# Patient Record
Sex: MALE | Race: WHITE | NOT HISPANIC OR LATINO | Employment: FULL TIME | URBAN - NONMETROPOLITAN AREA
[De-identification: names, ages, dates, MRNs, and addresses within clinical notes are randomized per-mention and may not be internally consistent; named-entity substitution may affect disease eponyms.]

---

## 2020-02-09 ENCOUNTER — APPOINTMENT (OUTPATIENT)
Dept: CT IMAGING | Facility: HOSPITAL | Age: 40
End: 2020-02-09

## 2020-02-09 ENCOUNTER — HOSPITAL ENCOUNTER (EMERGENCY)
Facility: HOSPITAL | Age: 40
Discharge: PSYCHIATRIC OR SUBSTANCE USE FACILITY | End: 2020-02-10
Attending: EMERGENCY MEDICINE | Admitting: STUDENT IN AN ORGANIZED HEALTH CARE EDUCATION/TRAINING PROGRAM

## 2020-02-09 DIAGNOSIS — F32.A DEPRESSION, UNSPECIFIED DEPRESSION TYPE: Primary | ICD-10-CM

## 2020-02-09 DIAGNOSIS — T71.162A SUICIDE ATTEMPT BY HANGING, INITIAL ENCOUNTER (HCC): ICD-10-CM

## 2020-02-09 LAB
ALBUMIN SERPL-MCNC: 4.4 G/DL (ref 3.5–5.2)
ALBUMIN/GLOB SERPL: 1.6 G/DL
ALP SERPL-CCNC: 113 U/L (ref 39–117)
ALT SERPL W P-5'-P-CCNC: 35 U/L (ref 1–41)
AMPHET+METHAMPHET UR QL: NEGATIVE
AMPHETAMINES UR QL: NEGATIVE
ANION GAP SERPL CALCULATED.3IONS-SCNC: 12.3 MMOL/L (ref 5–15)
APAP SERPL-MCNC: <5 MCG/ML (ref 10–30)
AST SERPL-CCNC: 29 U/L (ref 1–40)
BARBITURATES UR QL SCN: NEGATIVE
BASOPHILS # BLD AUTO: 0.04 10*3/MM3 (ref 0–0.2)
BASOPHILS NFR BLD AUTO: 0.3 % (ref 0–1.5)
BENZODIAZ UR QL SCN: NEGATIVE
BILIRUB SERPL-MCNC: 0.4 MG/DL (ref 0.2–1.2)
BUN BLD-MCNC: 12 MG/DL (ref 6–20)
BUN/CREAT SERPL: 11.2 (ref 7–25)
BUPRENORPHINE SERPL-MCNC: NEGATIVE NG/ML
CALCIUM SPEC-SCNC: 9.4 MG/DL (ref 8.6–10.5)
CANNABINOIDS SERPL QL: NEGATIVE
CHLORIDE SERPL-SCNC: 102 MMOL/L (ref 98–107)
CO2 SERPL-SCNC: 25.7 MMOL/L (ref 22–29)
COCAINE UR QL: NEGATIVE
CREAT BLD-MCNC: 1.07 MG/DL (ref 0.76–1.27)
DEPRECATED RDW RBC AUTO: 47.8 FL (ref 37–54)
EOSINOPHIL # BLD AUTO: 0.09 10*3/MM3 (ref 0–0.4)
EOSINOPHIL NFR BLD AUTO: 0.7 % (ref 0.3–6.2)
ERYTHROCYTE [DISTWIDTH] IN BLOOD BY AUTOMATED COUNT: 13.6 % (ref 12.3–15.4)
ETHANOL BLD-MCNC: <10 MG/DL (ref 0–10)
ETHANOL UR QL: <0.01 %
GFR SERPL CREATININE-BSD FRML MDRD: 77 ML/MIN/1.73
GLOBULIN UR ELPH-MCNC: 2.7 GM/DL
GLUCOSE BLD-MCNC: 114 MG/DL (ref 65–99)
HCT VFR BLD AUTO: 52.8 % (ref 37.5–51)
HGB BLD-MCNC: 17.8 G/DL (ref 13–17.7)
HOLD SPECIMEN: NORMAL
HOLD SPECIMEN: NORMAL
IMM GRANULOCYTES # BLD AUTO: 0.03 10*3/MM3 (ref 0–0.05)
IMM GRANULOCYTES NFR BLD AUTO: 0.2 % (ref 0–0.5)
LYMPHOCYTES # BLD AUTO: 2.07 10*3/MM3 (ref 0.7–3.1)
LYMPHOCYTES NFR BLD AUTO: 15.1 % (ref 19.6–45.3)
MAGNESIUM SERPL-MCNC: 2 MG/DL (ref 1.6–2.6)
MCH RBC QN AUTO: 31.7 PG (ref 26.6–33)
MCHC RBC AUTO-ENTMCNC: 33.7 G/DL (ref 31.5–35.7)
MCV RBC AUTO: 94 FL (ref 79–97)
METHADONE UR QL SCN: NEGATIVE
MONOCYTES # BLD AUTO: 1.18 10*3/MM3 (ref 0.1–0.9)
MONOCYTES NFR BLD AUTO: 8.6 % (ref 5–12)
NEUTROPHILS # BLD AUTO: 10.33 10*3/MM3 (ref 1.7–7)
NEUTROPHILS NFR BLD AUTO: 75.1 % (ref 42.7–76)
NRBC BLD AUTO-RTO: 0 /100 WBC (ref 0–0.2)
OPIATES UR QL: NEGATIVE
OXYCODONE UR QL SCN: NEGATIVE
PCP UR QL SCN: NEGATIVE
PLATELET # BLD AUTO: 237 10*3/MM3 (ref 140–450)
PMV BLD AUTO: 9.5 FL (ref 6–12)
POTASSIUM BLD-SCNC: 4.2 MMOL/L (ref 3.5–5.2)
PROPOXYPH UR QL: NEGATIVE
PROT SERPL-MCNC: 7.1 G/DL (ref 6–8.5)
RBC # BLD AUTO: 5.62 10*6/MM3 (ref 4.14–5.8)
SALICYLATES SERPL-MCNC: <0.3 MG/DL
SODIUM BLD-SCNC: 140 MMOL/L (ref 136–145)
TRICYCLICS UR QL SCN: NEGATIVE
TROPONIN T SERPL-MCNC: <0.01 NG/ML (ref 0–0.03)
WBC NRBC COR # BLD: 13.74 10*3/MM3 (ref 3.4–10.8)
WHOLE BLOOD HOLD SPECIMEN: NORMAL
WHOLE BLOOD HOLD SPECIMEN: NORMAL

## 2020-02-09 PROCEDURE — 80307 DRUG TEST PRSMV CHEM ANLYZR: CPT

## 2020-02-09 PROCEDURE — 84484 ASSAY OF TROPONIN QUANT: CPT | Performed by: EMERGENCY MEDICINE

## 2020-02-09 PROCEDURE — 85025 COMPLETE CBC W/AUTO DIFF WBC: CPT | Performed by: EMERGENCY MEDICINE

## 2020-02-09 PROCEDURE — 99284 EMERGENCY DEPT VISIT MOD MDM: CPT

## 2020-02-09 PROCEDURE — 83735 ASSAY OF MAGNESIUM: CPT

## 2020-02-09 PROCEDURE — 93005 ELECTROCARDIOGRAM TRACING: CPT

## 2020-02-09 PROCEDURE — 80053 COMPREHEN METABOLIC PANEL: CPT | Performed by: EMERGENCY MEDICINE

## 2020-02-09 PROCEDURE — 72125 CT NECK SPINE W/O DYE: CPT

## 2020-02-09 PROCEDURE — 70450 CT HEAD/BRAIN W/O DYE: CPT

## 2020-02-09 NOTE — ED PROVIDER NOTES
Subjective   39-year-old male presenting with suicide attempt.  Patient tells me that he has struggled with depression for some time, recently started a new antidepressant.  States that everything came to ahead today and when he got out of the shower he had overwhelming thoughts wanting to hurt himself, tied a neck tie around his neck and then tied it to the door handle and sat down.  He was then found by his girlfriend and son.  He has no specific complaints at this time.  He has never tried to harm himself before.  He denies any drug or alcohol use.          Review of Systems   Constitutional: Negative.    HENT: Negative.    Eyes: Negative.    Respiratory: Negative.    Cardiovascular: Negative.    Gastrointestinal: Negative.    Genitourinary: Negative.    Musculoskeletal: Negative.    Skin: Negative.    Neurological: Negative.    Psychiatric/Behavioral: Positive for dysphoric mood, self-injury and suicidal ideas.       Past Medical History:   Diagnosis Date   • Depression        No Known Allergies    Past Surgical History:   Procedure Laterality Date   • HERNIA REPAIR     • KNEE CARTILAGE SURGERY         No family history on file.    Social History     Socioeconomic History   • Marital status:      Spouse name: Not on file   • Number of children: Not on file   • Years of education: Not on file   • Highest education level: Not on file   Tobacco Use   • Smoking status: Former Smoker     Types: Cigarettes   Substance and Sexual Activity   • Alcohol use: Yes     Comment: socially   • Drug use: Never           Objective   Physical Exam   Constitutional: He is oriented to person, place, and time. He appears well-developed and well-nourished. No distress.   HENT:   Head: Normocephalic and atraumatic.   Right Ear: External ear normal.   Left Ear: External ear normal.   Nose: Nose normal.   Mouth/Throat: Oropharynx is clear and moist.   Occlusion normal   Eyes: Pupils are equal, round, and reactive to light.  Conjunctivae and EOM are normal.   Neck: Normal range of motion. Neck supple.   No hematomas, crepitus or swelling, no midline tenderness anteriorly or over the spine   Cardiovascular: Regular rhythm, normal heart sounds and intact distal pulses.   No carotid bruit, mild tachycardia   Pulmonary/Chest: Effort normal and breath sounds normal. No respiratory distress.   Abdominal: Soft. Bowel sounds are normal. He exhibits no distension. There is no tenderness. There is no rebound and no guarding.   Musculoskeletal: Normal range of motion. He exhibits no edema, tenderness or deformity.   Neurological: He is alert and oriented to person, place, and time.   Normal strength and sensation, gait normal   Skin: Skin is warm and dry. No rash noted.   No erythema or contusions   Psychiatric: He has a normal mood and affect. His behavior is normal.   Nursing note and vitals reviewed.      Procedures           ED Course  ED Course as of Feb 09 2300   Sun Feb 09, 2020 2138 Went in and met the patient's parents as well as his girlfriend.  We did discuss his insurance issues with his preferred place of placement, the [facility].  He has agreed to go willingly to [place] at this time.    [DT]   2258 Patient was accepted for transfer to [facility] in [place] by Dr. Fernando.    [DT]      ED Course User Index  [DT] Parker Vora MD                                               MDM  Number of Diagnoses or Management Options  Depression, unspecified depression type:   Suicide attempt by hanging, initial encounter (CMS/[facility]):   Diagnosis management comments: 39-year-old male with depression and suicide attempt.  Well-developed, well-nourished man in no distress with exam as above.  He has no findings of trauma to the neck.  Will obtain CT scan of the head and neck, lab work and EKG.  Will have behavioral health consult.  Disposition pending work-up and consultation.    DDX: Suicide attempt, depression, anxiety    EKG interpreted by me: Sinus  tachycardia, no acute ST/T changes, this is an abnormal EKG      Final diagnoses:   Depression, unspecified depression type   Suicide attempt by hanging, initial encounter (CMS/[facility])            Parker Vora MD  02/09/20 5597
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
## 2020-02-10 ENCOUNTER — HOSPITAL ENCOUNTER (INPATIENT)
Facility: HOSPITAL | Age: 40
LOS: 1 days | Discharge: HOME | End: 2020-02-11
Attending: PSYCHIATRY & NEUROLOGY | Admitting: PSYCHIATRY & NEUROLOGY

## 2020-02-10 VITALS
DIASTOLIC BLOOD PRESSURE: 87 MMHG | OXYGEN SATURATION: 96 % | RESPIRATION RATE: 18 BRPM | HEIGHT: 69 IN | WEIGHT: 205 LBS | HEART RATE: 85 BPM | TEMPERATURE: 98 F | SYSTOLIC BLOOD PRESSURE: 155 MMHG | BODY MASS INDEX: 30.36 KG/M2

## 2020-02-10 PROBLEM — F33.3 SEVERE RECURRENT MAJOR DEPRESSION WITH PSYCHOTIC FEATURES (HCC): Status: ACTIVE | Noted: 2020-02-10

## 2020-02-10 PROBLEM — F63.9 IMPULSE CONTROL DISORDER: Status: ACTIVE | Noted: 2020-02-10

## 2020-02-10 PROBLEM — F43.25 ADJUSTMENT DISORDER WITH MIXED DISTURBANCE OF EMOTIONS AND CONDUCT: Status: ACTIVE | Noted: 2020-02-10

## 2020-02-10 LAB
ALBUMIN SERPL-MCNC: 4.04 G/DL (ref 3.5–5.2)
ALBUMIN/GLOB SERPL: 1.5 G/DL
ALP SERPL-CCNC: 107 U/L (ref 39–117)
ALT SERPL W P-5'-P-CCNC: 30 U/L (ref 1–41)
ANION GAP SERPL CALCULATED.3IONS-SCNC: 11.2 MMOL/L (ref 5–15)
AST SERPL-CCNC: 24 U/L (ref 1–40)
BILIRUB SERPL-MCNC: 0.5 MG/DL (ref 0.2–1.2)
BUN BLD-MCNC: 12 MG/DL (ref 6–20)
BUN/CREAT SERPL: 11.7 (ref 7–25)
CALCIUM SPEC-SCNC: 9.2 MG/DL (ref 8.6–10.5)
CHLORIDE SERPL-SCNC: 103 MMOL/L (ref 98–107)
CO2 SERPL-SCNC: 25.8 MMOL/L (ref 22–29)
CREAT BLD-MCNC: 1.03 MG/DL (ref 0.76–1.27)
GFR SERPL CREATININE-BSD FRML MDRD: 81 ML/MIN/1.73
GLOBULIN UR ELPH-MCNC: 2.8 GM/DL
GLUCOSE BLD-MCNC: 103 MG/DL (ref 65–99)
POTASSIUM BLD-SCNC: 4.3 MMOL/L (ref 3.5–5.2)
PROT SERPL-MCNC: 6.8 G/DL (ref 6–8.5)
SODIUM BLD-SCNC: 140 MMOL/L (ref 136–145)

## 2020-02-10 PROCEDURE — 99223 1ST HOSP IP/OBS HIGH 75: CPT | Performed by: PSYCHIATRY & NEUROLOGY

## 2020-02-10 PROCEDURE — 80053 COMPREHEN METABOLIC PANEL: CPT | Performed by: PSYCHIATRY & NEUROLOGY

## 2020-02-10 RX ORDER — FAMOTIDINE 20 MG/1
20 TABLET, FILM COATED ORAL 2 TIMES DAILY PRN
Status: DISCONTINUED | OUTPATIENT
Start: 2020-02-10 | End: 2020-02-11 | Stop reason: HOSPADM

## 2020-02-10 RX ORDER — BENZTROPINE MESYLATE 1 MG/ML
1 INJECTION INTRAMUSCULAR; INTRAVENOUS ONCE AS NEEDED
Status: DISCONTINUED | OUTPATIENT
Start: 2020-02-10 | End: 2020-02-11 | Stop reason: HOSPADM

## 2020-02-10 RX ORDER — NICOTINE 21 MG/24HR
1 PATCH, TRANSDERMAL 24 HOURS TRANSDERMAL
Status: DISCONTINUED | OUTPATIENT
Start: 2020-02-10 | End: 2020-02-11 | Stop reason: HOSPADM

## 2020-02-10 RX ORDER — L.ACID,CASEI/B.ANIMAL/S.THERMO 16B CELL
1 CAPSULE ORAL DAILY
Status: CANCELLED | OUTPATIENT
Start: 2020-02-10

## 2020-02-10 RX ORDER — ACETAMINOPHEN 325 MG/1
650 TABLET ORAL EVERY 6 HOURS PRN
Status: DISCONTINUED | OUTPATIENT
Start: 2020-02-10 | End: 2020-02-11 | Stop reason: HOSPADM

## 2020-02-10 RX ORDER — ECHINACEA PURPUREA EXTRACT 125 MG
2 TABLET ORAL AS NEEDED
Status: DISCONTINUED | OUTPATIENT
Start: 2020-02-10 | End: 2020-02-11 | Stop reason: HOSPADM

## 2020-02-10 RX ORDER — RANITIDINE HCL 75 MG
75 TABLET ORAL DAILY PRN
COMMUNITY

## 2020-02-10 RX ORDER — BENZONATATE 100 MG/1
100 CAPSULE ORAL 3 TIMES DAILY PRN
Status: DISCONTINUED | OUTPATIENT
Start: 2020-02-10 | End: 2020-02-11 | Stop reason: HOSPADM

## 2020-02-10 RX ORDER — BUPROPION HYDROCHLORIDE 150 MG/1
150 TABLET ORAL DAILY
Status: DISCONTINUED | OUTPATIENT
Start: 2020-02-10 | End: 2020-02-11 | Stop reason: HOSPADM

## 2020-02-10 RX ORDER — MULTIVITAMIN
1 TABLET ORAL DAILY
Status: CANCELLED | OUTPATIENT
Start: 2020-02-10

## 2020-02-10 RX ORDER — IBUPROFEN 400 MG/1
400 TABLET ORAL EVERY 6 HOURS PRN
Status: DISCONTINUED | OUTPATIENT
Start: 2020-02-10 | End: 2020-02-11 | Stop reason: HOSPADM

## 2020-02-10 RX ORDER — CHLORAL HYDRATE 500 MG
1000 CAPSULE ORAL DAILY
COMMUNITY

## 2020-02-10 RX ORDER — L.ACID,PARA/B.BIFIDUM/S.THERM 8B CELL
1 CAPSULE ORAL DAILY
Status: DISCONTINUED | OUTPATIENT
Start: 2020-02-10 | End: 2020-02-11 | Stop reason: HOSPADM

## 2020-02-10 RX ORDER — IBUPROFEN 400 MG/1
200 TABLET ORAL EVERY 8 HOURS PRN
Status: CANCELLED | OUTPATIENT
Start: 2020-02-10

## 2020-02-10 RX ORDER — ONDANSETRON 4 MG/1
4 TABLET, FILM COATED ORAL EVERY 6 HOURS PRN
Status: DISCONTINUED | OUTPATIENT
Start: 2020-02-10 | End: 2020-02-11 | Stop reason: HOSPADM

## 2020-02-10 RX ORDER — TRAZODONE HYDROCHLORIDE 50 MG/1
50 TABLET ORAL NIGHTLY PRN
Status: DISCONTINUED | OUTPATIENT
Start: 2020-02-10 | End: 2020-02-11 | Stop reason: HOSPADM

## 2020-02-10 RX ORDER — LOPERAMIDE HYDROCHLORIDE 2 MG/1
2 CAPSULE ORAL
Status: DISCONTINUED | OUTPATIENT
Start: 2020-02-10 | End: 2020-02-11 | Stop reason: HOSPADM

## 2020-02-10 RX ORDER — L.ACID,CASEI/B.ANIMAL/S.THERMO 16B CELL
1 CAPSULE ORAL DAILY
COMMUNITY

## 2020-02-10 RX ORDER — MULTIVITAMIN
1 TABLET ORAL DAILY
Status: DISCONTINUED | OUTPATIENT
Start: 2020-02-10 | End: 2020-02-11 | Stop reason: HOSPADM

## 2020-02-10 RX ORDER — BENZTROPINE MESYLATE 1 MG/1
2 TABLET ORAL ONCE AS NEEDED
Status: DISCONTINUED | OUTPATIENT
Start: 2020-02-10 | End: 2020-02-11 | Stop reason: HOSPADM

## 2020-02-10 RX ORDER — FAMOTIDINE 20 MG/1
20 TABLET, FILM COATED ORAL DAILY PRN
Status: CANCELLED | OUTPATIENT
Start: 2020-02-10

## 2020-02-10 RX ORDER — MULTIVITAMIN
1 POWDER IN PACKET (EA) ORAL DAILY
COMMUNITY

## 2020-02-10 RX ORDER — HYDROXYZINE 50 MG/1
50 TABLET, FILM COATED ORAL EVERY 6 HOURS PRN
Status: DISCONTINUED | OUTPATIENT
Start: 2020-02-10 | End: 2020-02-11 | Stop reason: HOSPADM

## 2020-02-10 RX ORDER — ALUMINA, MAGNESIA, AND SIMETHICONE 2400; 2400; 240 MG/30ML; MG/30ML; MG/30ML
15 SUSPENSION ORAL EVERY 6 HOURS PRN
Status: DISCONTINUED | OUTPATIENT
Start: 2020-02-10 | End: 2020-02-11 | Stop reason: HOSPADM

## 2020-02-10 RX ORDER — IBUPROFEN 200 MG
200 TABLET ORAL EVERY 8 HOURS PRN
COMMUNITY

## 2020-02-10 RX ADMIN — SERTRALINE 25 MG: 50 TABLET, FILM COATED ORAL at 11:35

## 2020-02-10 RX ADMIN — HYDROXYZINE HYDROCHLORIDE 50 MG: 50 TABLET ORAL at 02:26

## 2020-02-10 RX ADMIN — TRAZODONE HYDROCHLORIDE 50 MG: 50 TABLET ORAL at 20:48

## 2020-02-10 RX ADMIN — BUPROPION HYDROCHLORIDE 150 MG: 150 TABLET, FILM COATED, EXTENDED RELEASE ORAL at 11:34

## 2020-02-10 RX ADMIN — CARIPRAZINE 1.5 MG: 1.5 CAPSULE, GELATIN COATED ORAL at 08:42

## 2020-02-10 RX ADMIN — Medication 1 TABLET: at 08:42

## 2020-02-10 RX ADMIN — Medication 1 CAPSULE: at 08:42
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
## 2020-02-10 NOTE — PLAN OF CARE
PT RATES ANXIETY AND DEPRESSION 1/10. DENIES ANY SI, HI, OR AVH. PT IS POSITIVE, CALM, AND COOPERATIVE DURING ASSESSMENT.

## 2020-02-10 NOTE — ED NOTES
"Update Timeline:    1930 Briefed on case by Lisa Summers, [facility] on-call therapist.  She prepared paperwork and faxed referral to the [facility].  Phoned the [facility] at 1955 to confirm receipt of referral, spoke with Key Montes De Oca in adult intake.  Partial paperwork has come through but not all of it.  Key requested emailed copy of referral. Emailed at 1955.  Will follow up after assessment of another pt here in the ED.  Pt is agreeable at his time, though reluctantly, to do to the [facility].  He did mention signing out AMA, but pt was advised if he did so police would be contacted.    2045 Spoke with Key at the [facility].  She reports if pt does not have insurance, he will have to pay up front for care.   Will re-visit options with pt after another pt assessment here in the ED is completed.    2115 Met with pt at bedside with Dr. Vora.  Pt reports he has no insurance.  Advised that he is not eligible to go to the [facility] without insurance without meeting up front out-of pocket pay.  Advised his options are [facility] or the [facility] at this time.  Pt reported it was \"no use\" to go to the [facility].   Reported in October 2018 he had an assessment there and \"they sent me home.  They didn't do anything.\"  He reports this was done under a 202A order.   Advised this admission would be a direct admission from [facility] to [facility], so there would be no determination re: admission status after he gets there.  Pt verbalized he was agreeable for referral to [facility] at this time.      2126   Phoned Gianfranco Santos RN at [facility].  Briefed her on pt assessment details and reviewed chart for needed information.  She is working on another direct admit and will review referral when completed.  Disposition pending.    Martita Vera, MSW, CSW  Certified Therapist    2223 Received call from Lead MARLEEN Santos.  MD has requested updated vitals and requested impression re: pt's elevated WBC count.  Consulted medical team.  MARLEEN Carmen will " obtain vitals.  Spoke with Dr. Vora who reports elevated WBC is a result of the acute stress reaction r/t pt trying to hang himself and further workup is not warranted at this time.    2230 Phoned Danielle, provided her with updated vitals and response from Dr. Vora re: WBC count.      2256  Received call from Danielle.  Pt has been accepted by Dr. Fernando as communicated by the Lead RN.  He will be going to [facility], report to [phone] ext. [phone], report to MARLEEN Alejo.  [facility] transport contacted, ETA [id number].  Medical team notified.    DAT Rhodes, CSW  Certified Therapist                       Martita Vera CSW  02/09/20 0420

## 2020-02-10 NOTE — ED NOTES
"0030  Was advised by nursing staff and security staff that pt became upset when he was unable to have his cell phone and clothes and refused to get in the [facility] car and refusing to \"go in a cage.\"  Pt started to elope and security advised pt could go voluntarily via [facility] or [employer] would be contacted for assistance.  Pt returned to car and got into [facility] transport vehicle.    0035 Phoned Danielle, Lead RN at [facility] to advise of recent events re: pt being upset regarding his paper scrubs, phone, and transport via [employer].  Advised he was en route at this time but wanted her to know about his bx here at the end of his stay as reported by RN staff and security.     Martita Vera, CSW  02/10/20 0048    "

## 2020-02-10 NOTE — NURSING NOTE
"Patient is a direct admit from [facility]. Patient attempted suicide by hanging himself with a tie. He reports he doesn't really remember what all happened today. He reports his stressors as going through a \"messy\" divorce and his medication change. He says \"Im just tired of all this drama.\" He reports he also feels like his medication took a big part in what happened. He was switched from Seroquel to vraylar about 2 weeks ago. He says since taking this he has no emotions and has felt numb. He denies si, hi, and avh at this time. He says \"After seeing how my family reacted I regret everything I have done.\"  "

## 2020-02-10 NOTE — PLAN OF CARE
Problem: Patient Care Overview  Goal: Plan of Care Review  Outcome: Ongoing (interventions implemented as appropriate)  Flowsheets (Taken 2/10/2020 0245)  Progress: no change  Plan of Care Reviewed With: patient  Patient Agreement with Plan of Care: agrees  Note:   New Pt.

## 2020-02-10 NOTE — DISCHARGE INSTR - APPOINTMENTS
[facility]  [address] [facility] [address] [place] [id number]   [phone]    2/12/2020 @ 10:00am with Geni Onofre
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
## 2020-02-11 VITALS
TEMPERATURE: 99.3 F | OXYGEN SATURATION: 96 % | HEART RATE: 82 BPM | HEIGHT: 69 IN | DIASTOLIC BLOOD PRESSURE: 105 MMHG | BODY MASS INDEX: 29.95 KG/M2 | SYSTOLIC BLOOD PRESSURE: 144 MMHG | RESPIRATION RATE: 16 BRPM | WEIGHT: 202.2 LBS

## 2020-02-11 PROCEDURE — 99239 HOSP IP/OBS DSCHRG MGMT >30: CPT | Performed by: PSYCHIATRY & NEUROLOGY

## 2020-02-11 RX ORDER — BUPROPION HYDROCHLORIDE 150 MG/1
150 TABLET ORAL DAILY
Qty: 30 TABLET | Refills: 0 | Status: SHIPPED | OUTPATIENT
Start: 2020-02-12

## 2020-02-11 RX ADMIN — BUPROPION HYDROCHLORIDE 150 MG: 150 TABLET, FILM COATED, EXTENDED RELEASE ORAL at 08:12

## 2020-02-11 RX ADMIN — ACETAMINOPHEN 650 MG: 325 TABLET ORAL at 15:19

## 2020-02-11 RX ADMIN — SERTRALINE 25 MG: 50 TABLET, FILM COATED ORAL at 08:12

## 2020-02-11 RX ADMIN — Medication 1 CAPSULE: at 08:11

## 2020-02-11 RX ADMIN — Medication 1 TABLET: at 08:11

## 2020-02-11 NOTE — PROGRESS NOTES
"INPATIENT PSYCHIATRIC PROGRESS NOTE    Name:  Ariel San  :  1980  MRN:  [id number]  Visit Number:  [id number]  Length of stay:  1    SUBJECTIVE  CC/Focus of Exam: depression, SI/SA    INTERVAL HISTORY:  Patient appearing better today. His estranged wife came for visitation yesterday, which he greatly appreciated. He feels supported and that will help with the recovery process.  They discussed continued individual therapy and the possibility of couples therapy.  He reports they are going to try to get back together, which seems to be a bit of a dramatic turnaround, so that may be an increased risk in the relationship instability that contributed to his impulsive act in the first place.    Depression rating 3/10  Anxiety rating 7/10  Sleep: better with trazodone  Withdrawal sx: denies  Cravin/10    Review of Systems   Constitutional: Negative.    Respiratory: Negative.    Cardiovascular: Negative.    Gastrointestinal: Negative.    Musculoskeletal: Negative.    Psychiatric/Behavioral: The patient is nervous/anxious and is hyperactive.        OBJECTIVE    Temp:  [98.6 °F (37 °C)-99.3 °F (37.4 °C)] 99.3 °F (37.4 °C)  Heart Rate:  [71-82] 82  Resp:  [16-18] 16  BP: (128-155)/() 144/105    MENTAL STATUS EXAM:  Appearance:Casually dressed, good hygeine.   Cooperation:Cooperative  Psychomotor: No psychomotor agitation/retardation, No EPS, No motor tics  Speech-normal rate, amount.  Mood \"feeling good\"   Affect-congruent, appropriate, stable  Thought Content-goal directed, no delusional material present  Thought process-linear, organized.  Suicidality: No SI  Homicidality: No HI  Perception: No AH/VH  Insight-fair   Judgement-fair    Lab Results (last 24 hours)     ** No results found for the last 24 hours. **             Imaging Results (Last 24 Hours)     ** No results found for the last 24 hours. **             ECG/EMG Results (most recent)     None           ALLERGIES: Patient has no known " allergies.      Current Facility-Administered Medications:   •  acetaminophen (TYLENOL) tablet 650 mg, 650 mg, Oral, Q6H PRN, Wong Fernando MD  •  aluminum-magnesium hydroxide-simethicone (MAALOX MAX) 400-400-40 MG/5ML suspension 15 mL, 15 mL, Oral, Q6H PRN, Wong Fernando MD  •  benzonatate (TESSALON) capsule 100 mg, 100 mg, Oral, TID PRN, Wong Fernando MD  •  benztropine (COGENTIN) tablet 2 mg, 2 mg, Oral, Once PRN **OR** benztropine (COGENTIN) injection 1 mg, 1 mg, Intramuscular, Once PRN, Wong Fernando MD  •  buPROPion XL (WELLBUTRIN XL) 24 hr tablet 150 mg, 150 mg, Oral, Daily, Gil Marion MD, 150 mg at 02/11/20 0812  •  famotidine (PEPCID) tablet 20 mg, 20 mg, Oral, BID PRN, Wong Fernando MD  •  hydrOXYzine (ATARAX) tablet 50 mg, 50 mg, Oral, Q6H PRN, Wong Fernando MD, 50 mg at 02/10/20 0226  •  ibuprofen (ADVIL,MOTRIN) tablet 400 mg, 400 mg, Oral, Q6H PRN, Wong Fernando MD  •  lactobacillus acidophilus (RISAQUAD) capsule 1 capsule, 1 capsule, Oral, Daily, Wong Fernando MD, 1 capsule at 02/11/20 0811  •  loperamide (IMODIUM) capsule 2 mg, 2 mg, Oral, Q2H PRN, Wong Fernando MD  •  magnesium hydroxide (MILK OF MAGNESIA) suspension 2400 mg/10mL 10 mL, 10 mL, Oral, Daily PRN, Wong Fernando MD  •  multivitamin (DAILY NIKOS) tablet 1 tablet, 1 tablet, Oral, Daily, Wong Fernando MD, 1 tablet at 02/11/20 0811  •  nicotine (NICODERM CQ) 21 MG/24HR patch 1 patch, 1 patch, Transdermal, Q24H, Wong Fernando MD  •  ondansetron (ZOFRAN) tablet 4 mg, 4 mg, Oral, Q6H PRN, Wong Fernando MD  •  [START ON 2/12/2020] sertraline (ZOLOFT) tablet 50 mg, 50 mg, Oral, Daily, Gil Marion MD  •  sodium chloride nasal spray 2 spray, 2 spray, Each Nare, PRN, Wong Fernando MD  •  traZODone (DESYREL) tablet 50 mg, 50 mg, Oral, Nightly PRN, Wong Fernando MD, 50 mg at 02/10/20 2048    Reviewed chart, notes, vitals, labs and EKG personally    ASSESSMENT & PLAN:      Adjustment disorder with mixed disturbance of emotions  and conduct    Impulse control disorder    Adjustment disorder with disturbance of emotions and conduct  -Patient with agitation, worsening mood and impulsive suicide attempt in the setting of interpersonal distress.  Admit for crisis stabilization  -Continue with current outpatient providers  -Patient denies suicidality, future oriented and requesting discharge.  Will discuss with therapist and family for safety planning and most likely consider this afternoon or tomorrow     Unspecified impulse control disorder  -Discontinue Vraylar 1.5 mg daily as he reports feeling mostly flat with little emotion whatsoever since starting the medication  -Increase sertraline to 50 mg daily  -Continue Wellbutrin  mg daily  -I do not believe that he qualifies for a personality disorder diagnosis at this time, but some of patient's current stressors appear related to narcissistic personality traits and related behaviors, including infidelity, compulsive behavior and minimization of severity of significant events, such as his infidelity and suicide attempt.  Should continue to address in outpatient therapy     Attempted hanging  -CT of head and neck within normal limits  -No reported pain.  Very minimal visible irritation on neck  -Monitor     Nicotine use disorder, early remission  -Encouraged patient on decision to stop smoking  -Wellbutrin may help with cravings as well     The patient has been admitted for safety and stabilization.  Patient will be monitored for suicidality daily and maintained on Special Precautions Level 3 (q15 min checks) .  The patient will have individual and group therapy with a master's level therapist. A master treatment plan will be developed and agreed upon by the patient and his/her treatment team.  The patient's estimated length of stay in the hospital is 2-3 days.     Special precautions: Special Precautions Level 3 (q15 min checks)     Behavioral Health Treatment Plan and Problem List: I have  reviewed and approved the Behavioral Health Treatment Plan and Problem list.  The patient has had a chance to review and agrees with the treatment plan.     Clinician:  Gil Marion MD  02/11/20  8:46 AM

## 2020-02-11 NOTE — PLAN OF CARE
"  Problem: Patient Care Overview  Goal: Interprofessional Rounds/Family Conf  Flowsheets (Taken 2/11/2020 1020)  Summary: Therapist staffed case with Dr Marion. He asked me to contact the patient's wife for discharge planning.  Note:   Therapist contacted the patient's wife and conducted safety and discharge planning. Strongly encouraged her to safeguard the home. She verbalized understanding.  Wife stated that she was ok with patient returning home today and would monitor him at home. She agrees to pick the patient up at discharge.    Met with patient one on one who is future oriented and looking forward to discharge. Patient states that he now has hope since his wife came for visitation last night. He states that they talked about going to couple's counseling in the near future instead of putting it off. He states that he and his wife are going to \"try to make things work\".     Discussed how patient could have handled his stressors more effectively. Patient states that in the future he is going to be more proactive instead of reactive. He denies any suicidal ideation today. Rates his depression at a 3 and his anxiety at a 7 on a 1-10 scale with 10 being the  most intense behavior. Patient is able to identify healthy coping skills as working; being active in the gym and spending time with his family.    Discussed the importance with patient of complying with medications and aftercare appt.       "

## 2020-02-11 NOTE — PLAN OF CARE
Problem: Patient Care Overview  Goal: Plan of Care Review  Outcome: Ongoing (interventions implemented as appropriate)  Flowsheets (Taken 2/11/2020 9841)  Progress: improving  Plan of Care Reviewed With: patient  Patient Agreement with Plan of Care: agrees  Outcome Summary: Patient calm and cooperative. Rates anxiety and depression both a 1. Denies SI/HI.

## 2020-02-13 NOTE — PROGRESS NOTES
Behavioral Health Discharge Summary             Please fax within 24 hours of discharge to [facility] at: [phone]      Member Name: Ariel San Member ID: [id number]   Authorization Number: [id number] Phone: [phone]   Member Address: [address] [place] [address]   Discharge Date: 02/11/20 Level of Care at Discharge:    Facility: [facility] Staff Completing Form: Irene ARCE RN    If the member is being discharged directly to a residential or extended care program, please specify the type below.   __Private Child-Caring Facility (PCC) Residential/Group Home   __Private Child-Caring Facility (PCC) Therapeutic Foster Care   __Residential Treatment Facility (RTF)   __Psychiatric Residential Treatment Facility (PRTF I or II)   __Long-Term Acute Inpatient Hospital Services or Extended Care Unit (ECU)   __Other (please specify):    Brief discharge summary of treatment received (for follow up by the case management team): D/C clinical with list of medications and follow up appts given to patient upon discharge.     BRIEF SUMMARY OF RECOMMENDATIONS FOR ONGOING TREATMENT     Discharged to where: Home   Discharge diagnoses: F 43.25 F 63.9   Axis I:    Axis II:    Axis III:    Axis IV:    Axis V:    Does the member understand his/her DX?  Yes          Medication     Dose     Schedule Supply/  Quantity  Given at Discharge RX Provided  Yes/No  If Rx Provided, Quantity RX Prior Auth Required  Yes/No Prior Auth  Completed   Wellbutrin  150 mg  Daily         sertraline 50 mg  Daily                                                                               Does the member understand the reason for taking these medications? Yes                                                           FOLLOW-UP APPOINTMENTS   Please schedule within 7 days of discharge and provide appointment details for all referred services.    PCP/Other Providers Involved in Treatment:     Appointment Type: [facility]  Provider Name: [facility]  Provider Phone:   [phone] Appointment Date: 02/12/20 Appointment Time:   10 AM with Geni Matthews   (new to OP services)        Case Management    Is the member already enrolled in case management?  Yes/No  If yes, date the CM was notified:    If no, was the CM referral offered?  Yes/No  Accepted? Yes/No    Is the Release of Information in the chart? Yes/No:      Medication Management (for member discharged with psychiatric medications):      A&D Treatment (for member with substance abuse/   dependence in the past year):      Medical Condition (for member with a medical condition):    Other recommended treatment:    Do you have any concerns about the discharge plan?  No    If yes, explain:    Was the member involved in the discharge planning?  Yes    If no, explain:    Was a copy of the discharge plan provided to the member?  Yes    If no, explain:
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
## 2021-09-13 ENCOUNTER — OFFICE VISIT (OUTPATIENT)
Dept: SLEEP MEDICINE | Facility: CLINIC | Age: 41
End: 2021-09-13

## 2021-09-13 VITALS
BODY MASS INDEX: 32.08 KG/M2 | HEART RATE: 87 BPM | WEIGHT: 216.6 LBS | OXYGEN SATURATION: 99 % | DIASTOLIC BLOOD PRESSURE: 80 MMHG | HEIGHT: 69 IN | SYSTOLIC BLOOD PRESSURE: 116 MMHG

## 2021-09-13 DIAGNOSIS — G47.33 OBSTRUCTIVE SLEEP APNEA, ADULT: ICD-10-CM

## 2021-09-13 DIAGNOSIS — E66.09 CLASS 1 OBESITY DUE TO EXCESS CALORIES WITHOUT SERIOUS COMORBIDITY WITH BODY MASS INDEX (BMI) OF 32.0 TO 32.9 IN ADULT: ICD-10-CM

## 2021-09-13 DIAGNOSIS — R06.83 SNORING: Primary | ICD-10-CM

## 2021-09-13 PROCEDURE — 99202 OFFICE O/P NEW SF 15 MIN: CPT | Performed by: INTERNAL MEDICINE

## 2021-11-17 ENCOUNTER — HOSPITAL ENCOUNTER (OUTPATIENT)
Dept: SLEEP MEDICINE | Facility: HOSPITAL | Age: 41
Discharge: HOME | End: 2021-11-17
Admitting: INTERNAL MEDICINE

## 2021-11-17 DIAGNOSIS — G47.33 OBSTRUCTIVE SLEEP APNEA, ADULT: ICD-10-CM

## 2021-11-17 DIAGNOSIS — R06.83 SNORING: ICD-10-CM

## 2021-11-17 PROCEDURE — 95806 SLEEP STUDY UNATT&RESP EFFT: CPT | Performed by: INTERNAL MEDICINE

## 2021-11-17 PROCEDURE — 95806 SLEEP STUDY UNATT&RESP EFFT: CPT

## 2021-12-03 ENCOUNTER — OFFICE VISIT (OUTPATIENT)
Dept: SLEEP MEDICINE | Facility: CLINIC | Age: 41
End: 2021-12-03

## 2021-12-03 VITALS
OXYGEN SATURATION: 98 % | WEIGHT: 216 LBS | SYSTOLIC BLOOD PRESSURE: 148 MMHG | BODY MASS INDEX: 31.99 KG/M2 | DIASTOLIC BLOOD PRESSURE: 76 MMHG | HEART RATE: 79 BPM | HEIGHT: 69 IN

## 2021-12-03 DIAGNOSIS — G47.33 OBSTRUCTIVE SLEEP APNEA, ADULT: Primary | ICD-10-CM

## 2021-12-03 DIAGNOSIS — E66.09 CLASS 1 OBESITY DUE TO EXCESS CALORIES WITHOUT SERIOUS COMORBIDITY WITH BODY MASS INDEX (BMI) OF 32.0 TO 32.9 IN ADULT: ICD-10-CM

## 2021-12-03 PROCEDURE — 99213 OFFICE O/P EST LOW 20 MIN: CPT | Performed by: INTERNAL MEDICINE

## 2021-12-03 RX ORDER — HYDROCODONE BITARTRATE AND ACETAMINOPHEN 10; 325 MG/1; MG/1
1 TABLET ORAL EVERY 6 HOURS PRN
COMMUNITY

## 2021-12-18 NOTE — PROGRESS NOTES
"Chief Complaint  Follow-up of snoring and nonrestorative sleep    Subjective         Ariel San presents to [facility] for the evaluation of snoring nonrestorative sleep.  Primary care provider is Dr. Goode.  He is seen in person in the sleep clinic.  History of Present Illness  Patient was last seen in clinic September 13.  He had snoring and nonrestorative sleep and obesity.  He says he has been seen about the same.  He says he still has significant snoring and has difficulty staying asleep.  He denies any real changes in his health status although he did have his wisdom teeth removed.  He thought his study night was fairly normal.    Review of Systems   Constitutional: Negative.    HENT: Negative.    Eyes: Negative.    Respiratory: Positive for apnea.    Cardiovascular: Negative.    Gastrointestinal: Negative.    Endocrine: Negative.    Genitourinary: Negative.    Musculoskeletal: Negative.    Skin: Negative.    Allergic/Immunologic: Negative.    Neurological: Negative.    Hematological: Negative.    Psychiatric/Behavioral: Negative.      [place] score is 13/24  Objective   Vital Signs:   /76 (BP Location: Left arm, Patient Position: Sitting, Cuff Size: Adult)   Pulse 79   Ht 175.3 cm (69.02\")   Wt 98 kg (216 lb)   SpO2 98%   BMI 31.88 kg/m²     Physical Exam patient appears to be awake and alert.  He is not appear to be in acute respiratory distress.    He is normocephalic.    Lungs are clear.    Cardiac exam revealed normal S1 and S2.    Extremities showed no edema.  Result Review :    Home sleep testing on 11/17 showed AHI of 43.3.  In the supine position his index was 67.4.  He spent 15 minutes in the desaturated state     Assessment and Plan   Diagnoses and all orders for this visit:    1. Obstructive sleep apnea, adult (Primary)  -     Detailed AutoPAP Order    2. Class 1 obesity due to excess calories without serious comorbidity with body mass index (BMI) of " 32.0 to 32.9 in adult    Patient does have severe obstructive apnea.  We have discussed potential therapies again including CPAP, weight control, oral appliance, and surgery.  We have discussed the long-term consequences of untreated obstructive sleep apnea.  These include hypertension, diabetes, heart disease, stroke, and dementia.  After discussion he does wish to try CPAP.  We will place orders for CPAP.  We will see him back in 2 months.  He is encouraged lose weight.  He is encouraged to avoid alcohol and sedatives close to bedtime.  He encouraged to practice lateral position sleep.  I spent 25 minutes caring for Ariel on this date of service. This time includes time spent by me in the following activities:reviewing tests, obtaining and/or reviewing a separately obtained history, performing a medically appropriate examination and/or evaluation , counseling and educating the patient/family/caregiver, ordering medications, tests, or procedures and documenting information in the medical record  Follow Up   Return in about 2 months (around 2/3/2022) for 31 to 90 days after PAP setup, Next scheduled follow-up.  Patient was given instructions and counseling regarding his condition or for health maintenance advice. Please see specific information pulled into the AVS if appropriate.   Robby Hoffman MD [facility]  Sleep Medicine  Pulmonary and Critical Care Medicine

## 2024-08-01 NOTE — PROGRESS NOTES
Subjective   Ariel San is a 40 y.o. male is being seen for consultation today at the request of Dr. Billie Goode for evaluation of snoring and possible sleep disordered breathing.  He is seen in person in the sleep clinic.    History of Present Illness  Patient is been told that he snores loudly and has been noted by others to have apneas.  He had snoring noted for at least 10 years.  He also had apneas noted for the past 6 months.  He denies awakening gasping for breath.  He is not rested on arising the morning.  He denies morning headaches.  He occasionally falls asleep if sitting quietly needed.    He has a history of reflux and is taking over-the-counter medications.  He denies awakening with a dry mouth.  He has broken his nose several times.  He denies having trouble breathing through his nose.  He denies kicking or jerking his legs at night.  He denies any chronic pain that keeps him awake.  He has previously been noted to have lower heart rates with napping.    He goes to bed about 10 PM.  He will fall asleep in 20 minutes.  He awakens 2 times during the night.  He thinks he gets about 6 hours of sleep but is not rested.  He denies any history of hypertension, diabetes, coronary disease.  No Known Allergies       Current Outpatient Medications:   •  buPROPion XL (WELLBUTRIN XL) 150 MG 24 hr tablet, Take 1 tablet by mouth Daily. Indications: Attention Deficit Hyperactivity Disorder, Disp: 30 tablet, Rfl: 0  •  ibuprofen (ADVIL,MOTRIN) 200 MG tablet, Take 200 mg by mouth Every 8 (Eight) Hours As Needed for Mild Pain ., Disp: , Rfl:   •  Multiple Vitamin (ONE-DAILY MULTI-VITAMIN) pack, Take 1 tablet by mouth Daily., Disp: , Rfl:   •  Omega-3 Fatty Acids (FISH OIL) 1000 MG capsule capsule, Take 1,000 mg by mouth Daily., Disp: , Rfl:   •  Probiotic Product (RISAQUAD-2) capsule capsule, Take 1 capsule by mouth Daily., Disp: , Rfl:   •  raNITIdine (ZANTAC) 75 MG tablet, Take 75 mg by mouth Daily As Needed for  "Indigestion or Heartburn., Disp: , Rfl:   •  sertraline (ZOLOFT) 50 MG tablet, Take 1 tablet by mouth Daily. Indications: Generalized Anxiety Disorder, Disp: 30 tablet, Rfl: 0    Social History    Tobacco Use      Smoking status: Current Every Day Smoker apparently uses e-cigarettes for 15 years.        Packs/day: 1.00        Types: Cigarettes        Start date: 1999      Smokeless tobacco: Never Used       Social History     Substance and Sexual Activity   Alcohol Use Yes   • Alcohol/week: 5.0 standard drinks   • Types: 5 Cans of beer per week    Comment: 5-6 DRINKS 2-4 TIMES A MONTH       Caffeine: He has 2 cups of coffee per day    Past Medical History:   Diagnosis Date   • Depression        Past Surgical History:   Procedure Laterality Date   • HERNIA REPAIR     • KNEE CARTILAGE SURGERY         Family History   Problem Relation Age of Onset   • Heart disease Other    • Sleep apnea Other        The following portions of the patient's history were reviewed and updated as appropriate: allergies, current medications, past family history, past medical history, past social history, past surgical history and problem list.    Review of Systems   Constitutional: Negative.    HENT: Negative.    Eyes: Negative.    Respiratory: Positive for apnea.    Cardiovascular: Negative.    Gastrointestinal: Negative.    Endocrine: Negative.    Genitourinary: Negative.    Musculoskeletal: Negative.    Skin: Negative.    Allergic/Immunologic: Negative.    Neurological: Negative.    Hematological: Negative.    Psychiatric/Behavioral: Negative.    [place] score is 15/24    Objective     /80 (BP Location: Left arm, Patient Position: Sitting, Cuff Size: Adult)   Pulse 87   Ht 175.3 cm (69\")   Wt 98.2 kg (216 lb 9.6 oz)   SpO2 99%   BMI 31.99 kg/m²      Physical Exam  Patient peers to be awake alert.  Is not appear to be in acute respiratory distress.    He is normocephalic.  He has normal active class IV anatomy.    Lungs are " clear.    Cardiac exam revealed normal S1-S2.    Extremities showed no edema.    Assessment/Plan   Diagnoses and all orders for this visit:    1. Snoring (Primary)  -     Home Sleep Study; Future    2. Obstructive sleep apnea, adult  -     Home Sleep Study; Future    3. Class 1 obesity due to excess calories without serious comorbidity with body mass index (BMI) of 32.0 to 32.9 in adult    Patient has a history of snoring and observed apneas.  He does not excellent story for obstructive sleep apnea.  We will plan proceed to home sleep testing.  We discussed potential therapies including CPAP, weight control, oral appliance, and surgery.  We have discussed the long-term consequences of untreated obstructive sleep apnea.  These include hypertension, diabetes, heart disease, stroke, and dementia.  He is encouraged to lose weight.  He is encouraged avoid alcohol or sedatives close bedtime.  He is encouraged to practice lapses in sleep.  We will see him back after his study.    Total time: 25 minutes exclusive procedures.         Robby Hoffman MD [facility]  Sleep Medicine  Pulmonary and Critical Care Medicine     Detail Level: Zone